# Patient Record
Sex: FEMALE | Race: WHITE | Employment: OTHER | URBAN - METROPOLITAN AREA
[De-identification: names, ages, dates, MRNs, and addresses within clinical notes are randomized per-mention and may not be internally consistent; named-entity substitution may affect disease eponyms.]

---

## 2017-01-04 PROBLEM — O28.0 ABNORMAL QUAD SCREEN: Status: ACTIVE | Noted: 2017-01-04

## 2017-01-04 PROBLEM — O35.10X0 SUSPECTED FETAL CHROMOSOME ANOMALY AFFECTING ANTEPARTUM CARE OF MOTHER: Status: ACTIVE | Noted: 2017-01-04

## 2017-01-04 PROBLEM — O34.219 PREVIOUS CESAREAN SECTION COMPLICATING PREGNANCY, ANTEPARTUM CONDITION OR COMPLICATION: Status: ACTIVE | Noted: 2017-01-04

## 2017-01-04 PROBLEM — O99.332 TOBACCO USE AFFECTING PREGNANCY IN SECOND TRIMESTER, ANTEPARTUM: Status: ACTIVE | Noted: 2017-01-04

## 2017-01-04 PROBLEM — Z82.79 FAMILY HISTORY OF CONGENITAL HEART DISEASE: Status: ACTIVE | Noted: 2017-01-04

## 2017-01-04 PROBLEM — O09.891 MEDICATION EXPOSURE DURING FIRST TRIMESTER OF PREGNANCY: Status: ACTIVE | Noted: 2017-01-04

## 2017-03-10 PROBLEM — O47.03 PREMATURE UTERINE CONTRACTIONS CAUSING THREATENED PREMATURE LABOR IN THIRD TRIMESTER: Status: ACTIVE | Noted: 2017-03-10

## 2017-03-10 PROBLEM — O26.873 SHORT CERVIX DURING PREGNANCY IN THIRD TRIMESTER: Status: ACTIVE | Noted: 2017-03-10

## 2019-11-06 ENCOUNTER — APPOINTMENT (OUTPATIENT)
Dept: ULTRASOUND IMAGING | Age: 34
End: 2019-11-06
Payer: MEDICAID

## 2019-11-06 ENCOUNTER — APPOINTMENT (OUTPATIENT)
Dept: GENERAL RADIOLOGY | Age: 34
End: 2019-11-06
Payer: MEDICAID

## 2019-11-06 ENCOUNTER — HOSPITAL ENCOUNTER (EMERGENCY)
Age: 34
Discharge: HOME | End: 2019-11-06
Attending: EMERGENCY MEDICINE
Payer: MEDICAID

## 2019-11-06 VITALS
TEMPERATURE: 98 F | OXYGEN SATURATION: 99 % | DIASTOLIC BLOOD PRESSURE: 72 MMHG | HEIGHT: 61 IN | BODY MASS INDEX: 21.52 KG/M2 | SYSTOLIC BLOOD PRESSURE: 126 MMHG | WEIGHT: 114 LBS | HEART RATE: 82 BPM | RESPIRATION RATE: 18 BRPM

## 2019-11-06 DIAGNOSIS — M79.89 SWELLING OF RIGHT HAND: Primary | ICD-10-CM

## 2019-11-06 DIAGNOSIS — G56.21 IMPINGEMENT OF RIGHT ULNAR NERVE: ICD-10-CM

## 2019-11-06 LAB
HCG, URINE, POC: NEGATIVE
Lab: NORMAL
NEGATIVE QC PASS/FAIL: NORMAL
POSITIVE QC PASS/FAIL: NORMAL

## 2019-11-06 PROCEDURE — 73080 X-RAY EXAM OF ELBOW: CPT

## 2019-11-06 PROCEDURE — 93971 EXTREMITY STUDY: CPT

## 2019-11-06 PROCEDURE — 71046 X-RAY EXAM CHEST 2 VIEWS: CPT

## 2019-11-06 PROCEDURE — 99284 EMERGENCY DEPT VISIT MOD MDM: CPT

## 2019-11-06 RX ORDER — IBUPROFEN 800 MG/1
800 TABLET ORAL EVERY 6 HOURS PRN
Qty: 21 TABLET | Refills: 0 | Status: SHIPPED | OUTPATIENT
Start: 2019-11-06 | End: 2022-02-18

## 2019-11-06 ASSESSMENT — ENCOUNTER SYMPTOMS
SINUS PRESSURE: 0
COUGH: 0
NAUSEA: 0
DIARRHEA: 0
EYE REDNESS: 0
SORE THROAT: 0
ABDOMINAL DISTENTION: 0
WHEEZING: 1
EYE PAIN: 0
BACK PAIN: 0
SHORTNESS OF BREATH: 1
VOMITING: 0
EYE DISCHARGE: 0

## 2019-11-06 ASSESSMENT — PAIN SCALES - GENERAL: PAINLEVEL_OUTOF10: 8

## 2019-11-06 ASSESSMENT — PAIN DESCRIPTION - ORIENTATION: ORIENTATION: RIGHT

## 2022-02-13 ENCOUNTER — HOSPITAL ENCOUNTER (EMERGENCY)
Age: 37
Discharge: HOME | End: 2022-02-14
Payer: MEDICAID

## 2022-02-13 ENCOUNTER — APPOINTMENT (OUTPATIENT)
Dept: GENERAL RADIOLOGY | Age: 37
End: 2022-02-13
Payer: MEDICAID

## 2022-02-13 VITALS
SYSTOLIC BLOOD PRESSURE: 146 MMHG | HEIGHT: 61 IN | RESPIRATION RATE: 16 BRPM | HEART RATE: 104 BPM | DIASTOLIC BLOOD PRESSURE: 84 MMHG | WEIGHT: 130 LBS | OXYGEN SATURATION: 100 % | BODY MASS INDEX: 24.55 KG/M2 | TEMPERATURE: 97 F

## 2022-02-13 DIAGNOSIS — S52.591A OTHER CLOSED FRACTURE OF DISTAL END OF RIGHT RADIUS, INITIAL ENCOUNTER: Primary | ICD-10-CM

## 2022-02-13 PROCEDURE — 73110 X-RAY EXAM OF WRIST: CPT

## 2022-02-13 PROCEDURE — 6370000000 HC RX 637 (ALT 250 FOR IP): Performed by: NURSE PRACTITIONER

## 2022-02-13 PROCEDURE — 99284 EMERGENCY DEPT VISIT MOD MDM: CPT

## 2022-02-13 PROCEDURE — 29125 APPL SHORT ARM SPLINT STATIC: CPT

## 2022-02-13 RX ORDER — HYDROCODONE BITARTRATE AND ACETAMINOPHEN 5; 325 MG/1; MG/1
1 TABLET ORAL ONCE
Status: DISCONTINUED | OUTPATIENT
Start: 2022-02-13 | End: 2022-02-13

## 2022-02-13 RX ORDER — OXYCODONE HYDROCHLORIDE AND ACETAMINOPHEN 5; 325 MG/1; MG/1
1 TABLET ORAL ONCE
Status: COMPLETED | OUTPATIENT
Start: 2022-02-13 | End: 2022-02-13

## 2022-02-13 RX ORDER — OXYCODONE HYDROCHLORIDE AND ACETAMINOPHEN 5; 325 MG/1; MG/1
1 TABLET ORAL EVERY 6 HOURS PRN
Qty: 12 TABLET | Refills: 0 | Status: SHIPPED | OUTPATIENT
Start: 2022-02-13 | End: 2022-02-16

## 2022-02-13 RX ADMIN — OXYCODONE HYDROCHLORIDE AND ACETAMINOPHEN 1 TABLET: 5; 325 TABLET ORAL at 23:09

## 2022-02-13 ASSESSMENT — PAIN SCALES - GENERAL
PAINLEVEL_OUTOF10: 10
PAINLEVEL_OUTOF10: 10

## 2022-02-14 ASSESSMENT — PAIN SCALES - GENERAL: PAINLEVEL_OUTOF10: 6

## 2022-02-14 NOTE — ED PROVIDER NOTES
[address] [place] [address]  Department of Emergency Medicine   ED  Encounter Note  Admit Date/RoomTime: 2022 11:29 PM  ED Room: 34/34    NAME: Stacie Stevens  : 1985  MRN: [id number]     Chief Complaint:  Fall (fell on ice last night, no LOC, no thinnners) and Wrist Injury (right)    History of Present Illness       Stacie Stevens is a 42 y.o. old female who presents to the emergency department by private vehicle, for Right wrist pain which occured 1 day(s) prior to arrival.  The complaint is due to [facility] injury fall on ice. Patient has no prior history of pain/injury with regards to today's visit. She is right handed. Since onset the symptoms have been persistent. Her pain is aggraveated by any movement or pressure on or palpation of painful area and relieved by nothing. She denies any head injury, headache, loss of consciousness, confusion, dizziness, neck pain, chest pain, abdominal pain, back pain, numbness, weakness, blurred vision, nausea, vomiting, fever, chills, wounds or rash. ROS   Pertinent positives and negatives are stated within HPI, all other systems reviewed and are negative. Past Medical History:  has a past medical history of Abnormal Pap smear, Anxiety and depression, Bipolar 1 disorder ([address] Chronic kidney disease, Hepatitis C, History of blood transfusion, Postpartum depression, Psychiatric problem, Social anxiety disorder, and Suicide threat or attempt. Surgical History:  has a past surgical history that includes back surgery () and  section. Social History:  reports that she has been smoking. She has a 8.50 pack-year smoking history. She has never used smokeless tobacco. She reports that she does not drink alcohol and does not use drugs. Family History: family history is not on file.      Allergies: Ketorolac tromethamine    Physical Exam   Oxygen Saturation Interpretation: Normal.        ED Triage Vitals   BP Temp Temp src Pulse Resp SpO2 Height Weight   -- -- -- -- -- -- -- --         Constitutional:  Alert, development consistent with age. Neck:  Normal ROM. Supple. Non-tender. Right Wrist: distal radius dorsal aspect. Tenderness:  moderate. Positive snuffbox tenderness. Swelling: Mild. Deformity: no deformity observed/palpated. ROM: diminished range with pain. Skin:  no wounds, erythema, or swelling. Neurovascular: Motor deficit: none. Sensory deficit:   none. Pulse deficit: none. Capillary refill: normal.  Right Elbow: diffusely across elbow            Tenderness: none              Swelling: None. Deformity: no deformity observed/palpated. ROM: full range of motion. Skin:  no wounds, erythema, or swelling. Right Hand: all metacarpals             Tenderness: none              Swelling: None. Deformity: no deformity observed/palpated. ROM: full range of motion. Skin:  no wounds, erythema, or swelling. Lymphatics: No lymphangitis or adenopathy noted. Neurological:  Oriented. Motor functions intact. Lab / Imaging Results   (All laboratory and radiology results have been personally reviewed by myself)  Labs:  No results found for this visit on 02/13/22. Imaging: All Radiology results interpreted by Radiologist unless otherwise noted. XR WRIST RIGHT (MIN 3 VIEWS)   Final Result   Nondisplaced fracture, distal radius. ED Course / Medical Decision Making     Medications   oxyCODONE-acetaminophen (PERCOCET) 5-325 MG per tablet 1 tablet (1 tablet Oral Given 2/13/22 4306)        Consult:   none    Procedure(s):  PROCEDURE NOTE  2/14/22       Time: 9925    SPLINT  APPLICATION  Risks, benefits and alternatives (for applicable procedures below) described. Performed By: RUSTAM Slade CNP.     Indication:  fracture of distal left radius. Procedure:   A sugar tong splint was applied by me. The patient tolerated the procedure well. MDM:     Patient presented after she states she had a slip and fall on the ice yesterday with right wrist pain. She had no other complaints of injury or tenderness and clinical exam is benign besides the right wrist.  X-ray is consistent with a distal right nondisplaced radial fracture. She is neurovascularly intact. She was treated symptomatically in the emergency department. She was placed in a sugar tong splint and sling. She is appropriate for discharge and outpatient follow-up with orthopedic surgery. She is instructed to return to the emergency department immediately with any new or worsening symptoms. .    Plan of Care/Counseling:  RUSTAM Muñoz CNP reviewed today's visit with the patient in addition to providing specific details for the plan of care and counseling regarding the diagnosis and prognosis. Questions are answered at this time and are agreeable with the plan. Assessment      1. Other closed fracture of distal end of right radius, initial encounter      Plan   Disposition:   Discharged home. Patient condition is good    New Medications     New Prescriptions    OXYCODONE-ACETAMINOPHEN (PERCOCET) 5-325 MG PER TABLET    Take 1 tablet by mouth every 6 hours as needed for Pain for up to 3 days. Intended supply: 3 days. Take lowest dose possible to manage pain     Electronically signed by RUSTAM Muñoz CNP   DD: 2/13/22  **This report was transcribed using voice recognition software. Every effort was made to ensure accuracy; however, inadvertent computerized transcription errors may be present.   END OF ED PROVIDER NOTE         RUSTAM Pino CNP  02/14/22 0002

## 2022-02-16 ENCOUNTER — TELEPHONE (OUTPATIENT)
Dept: ADMINISTRATIVE | Age: 37
End: 2022-02-16

## 2022-02-16 NOTE — TELEPHONE ENCOUNTER
ED 2/13/22 TTS on call    Chief Complaint:  Fall (fell on ice last night, no LOC, no thinnners) and Wrist Injury (right)  Imaging: All Radiology results interpreted by Radiologist unless otherwise noted. XR WRIST RIGHT (MIN 3 VIEWS)   Final Result   Nondisplaced fracture, distal radius. Routing to provider for scheduling rec.

## 2022-02-16 NOTE — TELEPHONE ENCOUNTER
Er follow up needed [facility]. 02/13-02/14 , Other closed fracture of distal end of right radius, initial encounter. Dr. Abe Frank on call. Please contact pt.

## 2022-02-16 NOTE — TELEPHONE ENCOUNTER
Patient returned call, Advised office is waiting on scheduling recommendation.  Patient expressed understanding

## 2022-02-17 DIAGNOSIS — S52.501A CLOSED FRACTURE OF DISTAL END OF RIGHT RADIUS, UNSPECIFIED FRACTURE MORPHOLOGY, INITIAL ENCOUNTER: Primary | ICD-10-CM

## 2022-02-18 ENCOUNTER — OFFICE VISIT (OUTPATIENT)
Dept: ORTHOPEDIC SURGERY | Age: 37
End: 2022-02-18
Payer: MEDICAID

## 2022-02-18 ENCOUNTER — HOSPITAL ENCOUNTER (OUTPATIENT)
Dept: GENERAL RADIOLOGY | Age: 37
Discharge: HOME | End: 2022-02-20
Payer: MEDICAID

## 2022-02-18 VITALS — TEMPERATURE: 97.3 F

## 2022-02-18 DIAGNOSIS — S52.531A CLOSED COLLES' FRACTURE OF RIGHT RADIUS, INITIAL ENCOUNTER: Primary | ICD-10-CM

## 2022-02-18 DIAGNOSIS — S52.501A CLOSED FRACTURE OF DISTAL END OF RIGHT RADIUS, UNSPECIFIED FRACTURE MORPHOLOGY, INITIAL ENCOUNTER: ICD-10-CM

## 2022-02-18 PROCEDURE — 99202 OFFICE O/P NEW SF 15 MIN: CPT | Performed by: ORTHOPAEDIC SURGERY

## 2022-02-18 PROCEDURE — 25600 CLTX DST RDL FX/EPHYS SEP WO: CPT | Performed by: ORTHOPAEDIC SURGERY

## 2022-02-18 PROCEDURE — 4004F PT TOBACCO SCREEN RCVD TLK: CPT | Performed by: ORTHOPAEDIC SURGERY

## 2022-02-18 PROCEDURE — 29075 APPL CST ELBW FNGR SHORT ARM: CPT | Performed by: ORTHOPAEDIC SURGERY

## 2022-02-18 PROCEDURE — G8420 CALC BMI NORM PARAMETERS: HCPCS | Performed by: ORTHOPAEDIC SURGERY

## 2022-02-18 PROCEDURE — 73110 X-RAY EXAM OF WRIST: CPT

## 2022-02-18 PROCEDURE — G8484 FLU IMMUNIZE NO ADMIN: HCPCS | Performed by: ORTHOPAEDIC SURGERY

## 2022-02-18 PROCEDURE — 99204 OFFICE O/P NEW MOD 45 MIN: CPT | Performed by: ORTHOPAEDIC SURGERY

## 2022-02-18 PROCEDURE — G8427 DOCREV CUR MEDS BY ELIG CLIN: HCPCS | Performed by: ORTHOPAEDIC SURGERY

## 2022-02-18 RX ORDER — OXYCODONE HYDROCHLORIDE AND ACETAMINOPHEN 5; 325 MG/1; MG/1
1 TABLET ORAL EVERY 4 HOURS PRN
COMMUNITY
End: 2022-02-18 | Stop reason: ALTCHOICE

## 2022-02-18 RX ORDER — OXYCODONE HYDROCHLORIDE AND ACETAMINOPHEN 5; 325 MG/1; MG/1
1 TABLET ORAL EVERY 8 HOURS PRN
Qty: 21 TABLET | Refills: 0 | Status: SHIPPED | OUTPATIENT
Start: 2022-02-18 | End: 2022-02-18 | Stop reason: CLARIF

## 2022-02-18 RX ORDER — OXYCODONE HYDROCHLORIDE AND ACETAMINOPHEN 5; 325 MG/1; MG/1
1 TABLET ORAL EVERY 8 HOURS PRN
Qty: 21 TABLET | Refills: 0 | Status: SHIPPED | OUTPATIENT
Start: 2022-02-18 | End: 2022-02-25

## 2022-02-18 NOTE — PROGRESS NOTES
Department of Orthopedic Surgery  New Patient Note      CHIEF COMPLAINT:   Chief Complaint   Patient presents with    Follow-Up from [address] [place] [address]     ED 22 Nondisplaced fracture, distal radius. TTS (per FA)       HISTORY OF PRESENT ILLNESS:                The patient is a 42 y.o. right-hand-dominant female who presents today for first clinic visit after being seen in the emergency department. Patient states on ,  she slipped on ice falling onto an outstretched right wrist.  She experienced immediate pain with movement afterwards. She was seen in the emergency department and was diagnosed with a distal radius fracture. She was placed in a splint and informed to follow-up with our office. She states she continues to have pain in the right wrist.  She does have intermittent paresthesias in all her fingers. She states she is not currently working. Past Medical History:        Diagnosis Date    Abnormal Pap smear     HPV    Anxiety and depression     Bipolar 1 disorder (HCC)     Chronic kidney disease     Hx of kidney stones, in last pregnancy    Hepatitis C     History of blood transfusion     two transfusions after 2014     Postpartum depression     Psychiatric problem     Social anxiety disorder     Suicide threat or attempt     in past     Past Surgical History:        Procedure Laterality Date    BACK SURGERY  2010    rods and pins in back     SECTION       Current Medications:   No current facility-administered medications for this visit. Allergies:  Ketorolac tromethamine and Tramadol    Social History:   TOBACCO:   reports that she has been smoking. She has a 8.50 pack-year smoking history. She has never used smokeless tobacco.  ETOH:   reports no history of alcohol use. DRUGS:   reports no history of drug use. ACTIVITIES OF DAILY LIVING:    OCCUPATION:    Family History:   No family history on file.     Review of Systems   Constitutional: Negative for fever, chills, diaphoresis, appetite change and fatigue. HENT: Negative for dental issues, hearing loss and tinnitus. Negative for congestion, sinus pressure, sneezing, sore throat. Negative for headache. Eyes: Negative for visual disturbance, blurred and double vision. Negative for pain, discharge, redness and itching  Respiratory: Negative for cough, shortness of breath and wheezing. Cardiovascular: Negative for chest pain, palpitations and leg swelling. No dyspnea on exertion   Gastrointestinal:   Negative for nausea, vomiting, abdominal pain, diarrhea, constipation  or black or bloody. Hematologic\Lymphatic:  negative for bleeding, petechiae,   Genitourinary: Negative for hematuria and difficulty urinating. Musculoskeletal: Negative for neck pain and stiffness. Negative for back pain, joint swelling and gait problem. Skin: Negative for pallor, rash and wound. Neurological: Negative for dizziness, tremors, seizures, weakness, light-headedness, no TIA or stroke symptoms. No numbness and headaches. Psychiatric/Behavioral: Negative.      Physical Examination:   General appearance: alert, well appearing, and in no distress,  normal appearing weight  Mental status: alert, oriented to person, place, and time, normal mood, behavior, speech, dress, motor activity, and thought processes  Abdomen: soft, nondistended   Resp:   resp easy and unlabored, no audible wheezes note  Cardiac: distal pulses palpable, skin well perfused  Neurological: alert, oriented X3, normal speech, no focal findings or movement disorder noted, motor and sensory grossly normal bilaterally, normal muscle tone, no tremors, strength 5/5, normal gait and station  HEENT: normochephalic atraumatic, external ears and eyes normal, sclera normal, neck supple  Extremities:   peripheral pulses normal, no edema, redness or tenderness in the calves   Skin: normal coloration, no rashes or open wounds, no suspicious skin lesions noted  Psych: Affect euthymic   Musculoskeletal:   Right upper extremity:  · Skin intact circumferentially  · Bruising and edema noted over the volar and dorsal distal radius  · Tenderness palpation about the distal radius. Nontender about the elbow and shoulder proximally. · Pain with attempted motion about the wrist.  Patient hesitant to flex and extend the fingers however she does have active motion at her IP joints  · Compartments soft and compressible  · +AIN/PIN/Ulnar nerve function intact grossly  · +Radial pulse, Brisk Cap refill, hand warm and perfused  · Sensation intact to touch in radial/ulnar/median nerve distributions to hand        Temp 97.3 °F (36.3 °C) (Oral)      XR: 3 views of the right wrist obtained in office today and reviewed with patient demonstrating partially displaced extra-articular distal radius fracture. There is slight loss of volar tilt. In comparison to prior radiographs, fracture remains in stable alignment    DATA:    CBC:   Lab Results   Component Value Date    WBC 11.3 03/10/2017    RBC 3.46 03/10/2017    HGB 10.7 03/10/2017    HCT 31.4 03/10/2017    MCV 90.8 03/10/2017    MCH 30.9 03/10/2017    MCHC 34.1 03/10/2017    RDW 13.2 03/10/2017     03/10/2017    MPV 10.1 03/10/2017              ASSESSMENT:  Right distal radius fracture, closed      PLAN:  Patient seen and examined today in office. Her splint was taken down. Explained to patient that based on her radiographs, we can continue to monitor this injury nonoperatively. Her alignment falls within appropriate tolerances for distal radius fracture. At this time, we will transition patient into a short arm cast.  We will plan on seeing patient back in 1 week for repeat radiographs while in the cast. Continue with elevation of the right wrist. Patient continues to have pain from her injury. Her PDMP was reviewed and we have provided 1 additional week of Percocet in order to help with pain control.   Please see below for most recent prescription history          Patient seen and plan discussed with Dr. Margaret Vargas Attending    I have seen and evaluated the patient with the resident and agree with the above assessments on today's visit. I have performed the key components of the history and physical examination and concur completely with the findings and plans as documented above. Patient with right distal radius fracture. Reviewed imaging with her discussed her fracture displacement. Discussed treatment options including operative versus nonoperative interventions as well as risk and benefits of each. Patient has elected for nonoperative management despite associated risk of permanent deformity, loss of motion and function. She is to maintain sugar tong splint immobilization, elevation, nonweightbearing affected extremity. Patient complaining of pain due to acute fracture, did provide 1 week of Percocet. Discussed this will be the only narcotic prescription she will be getting from us. She will follow-up in office in 1 week with [facility] to ensure no interval displacement. Electronically signed by   Jimmy Walker DO  2/18/2022     NOTE: This report was transcribed using voice recognition software.  Every effort was made to ensure accuracy; however, inadvertent computerized transcription errors may be present

## 2022-02-18 NOTE — PATIENT INSTRUCTIONS
Maintain elevation of the right upper extremity  Please maintain cast and avoid getting cast wet  Please return in 1 week for repeat imaging  Please take pain medication as prescribed

## 2022-02-18 NOTE — PROGRESS NOTES
Patient presents today for ED 2/13/22 Nondisplaced fracture, distal radius. TTS (per FA). Patient states she is in pain whenever she stands up, however when she is at rest her pain level is minimal.    Patient states in the morning she wakes up flushed, heart beating fast.    Patient presented today with splint intact, was told to have patient take an Xray in splint.

## 2022-02-22 DIAGNOSIS — S52.531A CLOSED COLLES' FRACTURE OF RIGHT RADIUS, INITIAL ENCOUNTER: Primary | ICD-10-CM

## 2022-03-02 ENCOUNTER — TELEPHONE (OUTPATIENT)
Dept: ORTHOPEDIC SURGERY | Age: 37
End: 2022-03-02